# Patient Record
Sex: MALE | Race: WHITE | Employment: FULL TIME | URBAN - METROPOLITAN AREA
[De-identification: names, ages, dates, MRNs, and addresses within clinical notes are randomized per-mention and may not be internally consistent; named-entity substitution may affect disease eponyms.]

---

## 2018-05-17 ENCOUNTER — OFFICE VISIT - HEALTHEAST (OUTPATIENT)
Dept: FAMILY MEDICINE | Facility: CLINIC | Age: 48
End: 2018-05-17

## 2018-05-17 DIAGNOSIS — Z91.89 AT RISK FOR SLEEP APNEA: ICD-10-CM

## 2018-05-17 DIAGNOSIS — H93.8X3 PLUGGED FEELING IN EAR, BILATERAL: ICD-10-CM

## 2018-05-17 DIAGNOSIS — Z87.2 HISTORY OF FOOT ULCER: ICD-10-CM

## 2018-05-17 DIAGNOSIS — G62.9 PERIPHERAL NEUROPATHY: ICD-10-CM

## 2018-05-17 DIAGNOSIS — S03.00XA TMJ (DISLOCATION OF TEMPOROMANDIBULAR JOINT): ICD-10-CM

## 2018-05-17 ASSESSMENT — MIFFLIN-ST. JEOR: SCORE: 2034.96

## 2018-06-27 ENCOUNTER — COMMUNICATION - HEALTHEAST (OUTPATIENT)
Dept: FAMILY MEDICINE | Facility: CLINIC | Age: 48
End: 2018-06-27

## 2019-02-11 ENCOUNTER — RECORDS - HEALTHEAST (OUTPATIENT)
Dept: ADMINISTRATIVE | Facility: OTHER | Age: 49
End: 2019-02-11

## 2019-02-12 ENCOUNTER — RECORDS - HEALTHEAST (OUTPATIENT)
Dept: ADMINISTRATIVE | Facility: OTHER | Age: 49
End: 2019-02-12

## 2019-02-14 ENCOUNTER — RECORDS - HEALTHEAST (OUTPATIENT)
Dept: ADMINISTRATIVE | Facility: OTHER | Age: 49
End: 2019-02-14

## 2019-10-02 ENCOUNTER — HEALTH MAINTENANCE LETTER (OUTPATIENT)
Age: 49
End: 2019-10-02

## 2021-01-15 ENCOUNTER — HEALTH MAINTENANCE LETTER (OUTPATIENT)
Age: 51
End: 2021-01-15

## 2021-06-01 VITALS — WEIGHT: 257.38 LBS | HEIGHT: 71 IN | BODY MASS INDEX: 36.03 KG/M2

## 2021-06-18 NOTE — PROGRESS NOTES
"[facility] Note    Patient Name: Logan Kaufman  Patient Age: 47 y.o.  YOB: 1970  MRN: [id number]    Date of visit: 5/17/2018    There is no problem list on file for this patient.    Social History     Social History Narrative     No narrative on file     Outpatient Encounter Prescriptions as of 5/17/2018   Medication Sig Dispense Refill     fluticasone (FLONASE) 50 mcg/actuation nasal spray 2 sprays into each nostril 2 (two) times a day for 14 days. Discontinue if develop nose bleeds or other adverse reaction. 16 g 0     omeprazole (PRILOSEC) 20 MG capsule Take 20 mg by mouth.       predniSONE (DELTASONE) 20 MG tablet Take 2 tablets (40 mg total) by mouth daily for 5 days. 10 tablet 0     sod bicarb-sod chlor-neti pot (SINUS WASH NETIPOT) pkdv 1 Units into each nostril 2 (two) times a day for 14 days. Use sterile water. 1 each 0     No facility-administered encounter medications on file as of 5/17/2018.        Chief Complaint:   Chief Complaint   Patient presents with     plugged ear     x 6 months       /78  Pulse 72  Temp 98  F (36.7  C) (Oral)   Resp 12  Ht 5' 10.71\" (1.796 m)  Wt (!) 257 lb 6 oz (116.7 kg)  BMI 36.19 kg/m2  HPI:   6 months has been having bilateral ear pruritis and and feeling like their plugged and feels like he has water in his hear.  Rarely gets to where it hurts, that lasts only a few days.  Last night both ears were plugged.  This morning they don't feel plugged but has right anterior ear pain if moves jaw certain way or if he pushes anterior to ear.  No otorrhea.  Doesn't grind his teeth or chew gum.      Has had neuropathy, has been evaluated by neurology - has had blood evaluation and EMG.  Has juan ongoing for 13 years.  Lacks sensation in his legs from mid calf.  Has some hand weakness as well.      ROS: Pertinent ros findings in hpi, all other systems negative.    ______________________________________________________________________    [facility]" "ASSESSMENT    1.  Do you snore loudly (louder than talking or loud enough to be heard through closed doors):  At times    2.  Do you often feel tired, fatigued, or sleepy during the day:  no    3.  Has anyone observed you stop breathing during sleep:  no    4.  Do you have or are you being treated for high blood pressure:  no    5.  Body mass index > 35:  Body mass index is 36.19 kg/(m^2).    6.  Age > 50:  47 y.o.     7.  Neck circumference greater than 40 cm:  y    8.  Gender male:  male         A score of 3 or greater indicates a risk for sleep apnea (84% sensitivity).  A score of 5 or greater is more predictive of clinically relevant moderate to severe obstructive sleep apnea.    ______________________________________________________________________        Objective/Physical Exam:     /78  Pulse 72  Temp 98  F (36.7  C) (Oral)   Resp 12  Ht 5' 10.71\" (1.796 m)  Wt (!) 257 lb 6 oz (116.7 kg)  BMI 36.19 kg/m2    Heart: Regular rhythm, no rubs or gallops.  Normal rate: y  Murmur: n    Lungs:   Clear to auscultation bilaterally: y  Wheeze: n  Rhonchi: n  Crackles: n  Area of decreased breath sounds: n  Labored breathing: n      Left eye:  Conjunctival erythema: n  Purulent drainage: n  Proptosis:n    Right eye:  Conjunctival erythema: n  Purulent drainage: n  Proptosis: n    Right tympanic membrane:   color: pale - 1/2 of view obstructed with wax  No evidence of fluid or bulging  Drainage:n  Canal: not edematous no discharge  Pain with external ear manipulation: n  Foreign body: n    Left tympanic membrane:   color: pale - 1/2 of view occluded by wax  No evidence of fluid or bulging  Drainage: n  Canal: not edematous no discharge  Pain with external ear manipulation: n  Foreign body: n    ttp just anterior of tragus on right    No auricular protrusion or erythema/swelling over mastoid area bilaterally.    No white patches that scrape off, no deviation of uvula. no ulcerations noted.    No torticollis, " "neck stiffness, drooling, muffled/hot potato voice, submandibular swelling, trismus or stridor.    Pharynx:   Erythema: no  Pus pockets: n  Tender cervical lymphadenopathy: n    Well appearing: y  Moist mucous membranes: y    MSK: no muscle or joint swelling  Neuro: no dysarthria or gross asymmetry  Psych: full affect, oriented x 3  Hematologic: no petechiae or purpura    Skin: No rash.     Sinuses nttp.    Unable to dorsiflex right foot against resistance, barely able to without.  Left foot 2/5 dorsiflex.  4/5 plantar flexion both. dp 2+. asensate to mid shin both sides.  No evidence of foot ulcerations.  Bilateral hands muscle wasting evident - claw of right 4,5 digits.  4/5 strength of fingers. Rad pulse 2+.        Assessment/Plan:  No results found for this or any previous visit (from the past 24 hour(s)).  Encounter Diagnoses   Name Primary?     TMJ (dislocation of temporomandibular joint) Yes     Plugged feeling in ear, bilateral      Peripheral neuropathy      History of foot ulcer        Orders Placed This Encounter   Procedures     Ambulatory referral to Neurology       Referring to [place] for evaluation of neuropathy since no etiology found.  Diabetic shoe rx     I suspect ear symptoms are multifactorial.  Stop qtips.  Use mineral oil 5 drops for 5 minutes daily in each ear to help loosen wax that is there.  Symptoms in association with nasal congestion and seems somewhat seasonal - voice sounds \"nasal\", try sinus rinse and flonase.  May try prednisone if these are ineffective.  RTC for physical in near future - will recheck at that time.  Hopefully with better visualization of TM's.  The cerumen was too close to the TM's for curette and risk of perforation likely outweighed benefit of flushing given chronic bilaterality of symptoms.  Low suspicion for TM abnormality.  If not improving, ENT. Symptoms may be partially due to TMJ as well.      STOPBANG Total score:  Score is 4 - nearly 5 (almost 50), will " discuss possible sleep study at follow-up visit    Patient Instructions   May use sinus rinse (i.e. neti) with sterile water (bottled or tap water boiled x 10 minutes and cooled to room temperature) 2-3x daily for nasal congestion  May use flonase 1-2 sprays each nostril daily alone or after sinus rinse. Have head look down and direct spray upward and slightly lateral of midline. Discontinue if nosebleeds.   Alternatively, may use 4 sprays flonase in twice daily rinse or 6 sprays in once daily rinse.     RTC if sinusitis not improving after 10 days, improves and then worsens, or sinus pain lateralizes or develop focal pain with temp >101.     Aleve 500mg twice daily x 1 week.  May try prednisone if sinus rinses/flonase not helping after 1-2 weeks.  F/u Neurology. Schedule routine physical in near future.       Counseled patient regarding treatments, treatment options, risks and benefits and diagnosis.  The patient was interactive, attentive, verbalized understanding, and we discussed plan.     Collin Villagomez MD

## 2021-09-04 ENCOUNTER — HEALTH MAINTENANCE LETTER (OUTPATIENT)
Age: 51
End: 2021-09-04

## 2022-02-19 ENCOUNTER — HEALTH MAINTENANCE LETTER (OUTPATIENT)
Age: 52
End: 2022-02-19

## 2022-10-22 ENCOUNTER — HEALTH MAINTENANCE LETTER (OUTPATIENT)
Age: 52
End: 2022-10-22

## 2023-04-01 ENCOUNTER — HEALTH MAINTENANCE LETTER (OUTPATIENT)
Age: 53
End: 2023-04-01